# Patient Record
Sex: MALE | Race: BLACK OR AFRICAN AMERICAN | NOT HISPANIC OR LATINO | URBAN - METROPOLITAN AREA
[De-identification: names, ages, dates, MRNs, and addresses within clinical notes are randomized per-mention and may not be internally consistent; named-entity substitution may affect disease eponyms.]

---

## 2017-06-26 ENCOUNTER — HISTORICAL (OUTPATIENT)
Dept: LAB | Facility: HOSPITAL | Age: 52
End: 2017-06-26

## 2017-06-26 LAB
HIV 1+2 AB+HIV1 P24 AG SERPL QL IA: NEGATIVE
RPR SER QL: NORMAL

## 2017-07-27 ENCOUNTER — HISTORICAL (OUTPATIENT)
Dept: RADIOLOGY | Facility: HOSPITAL | Age: 52
End: 2017-07-27

## 2018-07-11 ENCOUNTER — HISTORICAL (OUTPATIENT)
Dept: ADMINISTRATIVE | Facility: HOSPITAL | Age: 53
End: 2018-07-11

## 2019-11-26 ENCOUNTER — HISTORICAL (OUTPATIENT)
Dept: ADMINISTRATIVE | Facility: HOSPITAL | Age: 54
End: 2019-11-26

## 2022-04-09 ENCOUNTER — HISTORICAL (OUTPATIENT)
Dept: ADMINISTRATIVE | Facility: HOSPITAL | Age: 57
End: 2022-04-09

## 2022-04-25 VITALS
WEIGHT: 221.31 LBS | HEIGHT: 71 IN | BODY MASS INDEX: 30.98 KG/M2 | SYSTOLIC BLOOD PRESSURE: 126 MMHG | DIASTOLIC BLOOD PRESSURE: 80 MMHG | OXYGEN SATURATION: 99 %

## 2023-04-17 PROBLEM — E78.00 HYPERCHOLESTEROLEMIA: Status: ACTIVE | Noted: 2023-04-17

## 2023-04-17 PROBLEM — R73.03 PREDIABETES: Status: ACTIVE | Noted: 2023-04-17

## 2023-04-17 PROBLEM — G47.33 OSA ON CPAP: Status: ACTIVE | Noted: 2023-04-17

## 2023-04-17 PROBLEM — E29.1 HYPOGONADISM IN MALE: Status: ACTIVE | Noted: 2023-04-17

## 2024-12-30 ENCOUNTER — OFFICE VISIT (OUTPATIENT)
Dept: URGENT CARE | Facility: CLINIC | Age: 59
End: 2024-12-30
Payer: COMMERCIAL

## 2024-12-30 VITALS
OXYGEN SATURATION: 98 % | DIASTOLIC BLOOD PRESSURE: 72 MMHG | WEIGHT: 219 LBS | TEMPERATURE: 98 F | BODY MASS INDEX: 31.35 KG/M2 | HEIGHT: 70 IN | SYSTOLIC BLOOD PRESSURE: 112 MMHG | HEART RATE: 75 BPM | RESPIRATION RATE: 19 BRPM

## 2024-12-30 DIAGNOSIS — R05.1 ACUTE COUGH: ICD-10-CM

## 2024-12-30 DIAGNOSIS — J06.9 VIRAL UPPER RESPIRATORY TRACT INFECTION: Primary | ICD-10-CM

## 2024-12-30 RX ORDER — PREDNISONE 20 MG/1
20 TABLET ORAL 2 TIMES DAILY
Qty: 14 TABLET | Refills: 0 | Status: SHIPPED | OUTPATIENT
Start: 2024-12-30 | End: 2025-01-06

## 2024-12-30 RX ORDER — PROMETHAZINE HYDROCHLORIDE AND DEXTROMETHORPHAN HYDROBROMIDE 6.25; 15 MG/5ML; MG/5ML
5 SYRUP ORAL EVERY 4 HOURS PRN
Qty: 180 ML | Refills: 0 | Status: SHIPPED | OUTPATIENT
Start: 2024-12-30 | End: 2025-01-09

## 2024-12-30 NOTE — PATIENT INSTRUCTIONS
Mucinex OTC as directed or prescription promethazine DM preferably at night for cough  Take prescription prednisone as directed  Increase oral fluids  Ibuprofen or Tylenol as directed for pain or fever  Please follow up with your primary care provider within 2-5 days if your signs and symptoms have not resolved or worsen.

## 2024-12-30 NOTE — PROGRESS NOTES
"Subjective:      Patient ID: Natan Momin is a 59 y.o. male.    Vitals:  height is 5' 10" (1.778 m) and weight is 99.3 kg (219 lb). His temperature is 98.3 °F (36.8 °C). His blood pressure is 112/72 and his pulse is 75. His respiration is 19 and oxygen saturation is 98%.     Chief Complaint: Nasal Congestion     Patient is a 59 y.o. male who presents to urgent care with complaints of sinus congestion, cough,  runny nose, sneezing x3 days.       HENT:  Positive for congestion.    Respiratory:  Positive for cough.       Objective:     Physical Exam   Constitutional: He is oriented to person, place, and time. He appears well-developed. He is cooperative.  Non-toxic appearance. He does not appear ill. No distress.   HENT:   Head: Normocephalic and atraumatic.   Ears:   Right Ear: Hearing, tympanic membrane, external ear and ear canal normal.   Left Ear: Hearing, tympanic membrane, external ear and ear canal normal.   Nose: Nose normal. No mucosal edema, rhinorrhea or nasal deformity. No epistaxis. Right sinus exhibits no maxillary sinus tenderness and no frontal sinus tenderness. Left sinus exhibits no maxillary sinus tenderness and no frontal sinus tenderness.   Mouth/Throat: Uvula is midline, oropharynx is clear and moist and mucous membranes are normal. No trismus in the jaw. Normal dentition. No uvula swelling. No oropharyngeal exudate, posterior oropharyngeal edema or posterior oropharyngeal erythema.   Eyes: Conjunctivae and lids are normal. No scleral icterus.   Neck: Trachea normal and phonation normal. Neck supple. No edema present. No erythema present. No neck rigidity present.   Cardiovascular: Normal rate, regular rhythm, normal heart sounds and normal pulses.   Pulmonary/Chest: Effort normal and breath sounds normal. No respiratory distress. He has no decreased breath sounds. He has no rhonchi.   Abdominal: Normal appearance.   Musculoskeletal: Normal range of motion.         General: No deformity. Normal " range of motion.   Neurological: He is alert and oriented to person, place, and time. He exhibits normal muscle tone. Coordination normal.   Skin: Skin is warm, dry, intact, not diaphoretic and not pale.   Psychiatric: His speech is normal and behavior is normal. Judgment and thought content normal.   Nursing note and vitals reviewed.    Previous History:     Review of patient's allergies indicates:   Allergen Reactions    Pravastatin      Other reaction(s): leg pain    Simvastatin      Other reaction(s): dyspepsia       Past Medical History:   Diagnosis Date    Hyperlipidemia      Current Outpatient Medications   Medication Instructions    atorvastatin (LIPITOR) 20 mg, Oral, Daily    predniSONE (DELTASONE) 20 mg, Oral, 2 times daily    promethazine-dextromethorphan (PROMETHAZINE-DM) 6.25-15 mg/5 mL Syrp 5 mLs, Oral, Every 4 hours PRN    sildenafil (REVATIO) 20 mg Tab TAKE 3 TABLETS BY MOUTH 1-2 HOURS PRIOR TO INTERCOURSE     History reviewed. No pertinent surgical history.  Family History   Problem Relation Name Age of Onset    Heart disease Mother      Hyperlipidemia Father         Social History     Tobacco Use    Smoking status: Never    Smokeless tobacco: Never   Substance Use Topics    Alcohol use: Yes    Drug use: Never     Assessment:     1. Viral upper respiratory tract infection    2. Acute cough        Plan:   Mucinex OTC as directed or prescription promethazine DM preferably at night for cough  Take prescription prednisone as directed  Increase oral fluids  Ibuprofen or Tylenol as directed for pain or fever  Please follow up with your primary care provider within 2-5 days if your signs and symptoms have not resolved or worsen.      Viral upper respiratory tract infection    Acute cough  -     predniSONE (DELTASONE) 20 MG tablet; Take 1 tablet (20 mg total) by mouth 2 (two) times daily. for 7 days  Dispense: 14 tablet; Refill: 0  -     promethazine-dextromethorphan (PROMETHAZINE-DM) 6.25-15 mg/5 mL Syrp;  Take 5 mLs by mouth every 4 (four) hours as needed (cough).  Dispense: 180 mL; Refill: 0

## 2025-01-15 ENCOUNTER — OFFICE VISIT (OUTPATIENT)
Dept: FAMILY MEDICINE | Facility: CLINIC | Age: 60
End: 2025-01-15
Payer: COMMERCIAL

## 2025-01-15 ENCOUNTER — LAB VISIT (OUTPATIENT)
Dept: LAB | Facility: HOSPITAL | Age: 60
End: 2025-01-15
Attending: FAMILY MEDICINE
Payer: COMMERCIAL

## 2025-01-15 VITALS
HEIGHT: 70 IN | OXYGEN SATURATION: 99 % | RESPIRATION RATE: 17 BRPM | BODY MASS INDEX: 31.4 KG/M2 | WEIGHT: 219.31 LBS | DIASTOLIC BLOOD PRESSURE: 81 MMHG | TEMPERATURE: 99 F | SYSTOLIC BLOOD PRESSURE: 125 MMHG | HEART RATE: 66 BPM

## 2025-01-15 DIAGNOSIS — Z13.6 ENCOUNTER FOR SCREENING FOR CARDIOVASCULAR DISORDERS: ICD-10-CM

## 2025-01-15 DIAGNOSIS — E29.1 HYPOGONADISM IN MALE: ICD-10-CM

## 2025-01-15 DIAGNOSIS — D70.9 NEUTROPENIA, UNSPECIFIED TYPE: ICD-10-CM

## 2025-01-15 DIAGNOSIS — E78.49 ESSENTIAL FAMILIAL HYPERLIPIDEMIA: ICD-10-CM

## 2025-01-15 DIAGNOSIS — G47.33 OSA ON CPAP: ICD-10-CM

## 2025-01-15 DIAGNOSIS — E78.49 ESSENTIAL FAMILIAL HYPERLIPIDEMIA: Primary | ICD-10-CM

## 2025-01-15 DIAGNOSIS — Z11.59 ENCOUNTER FOR HEPATITIS C SCREENING TEST FOR LOW RISK PATIENT: ICD-10-CM

## 2025-01-15 DIAGNOSIS — R73.03 PREDIABETES: ICD-10-CM

## 2025-01-15 DIAGNOSIS — E66.811 CLASS 1 OBESITY DUE TO EXCESS CALORIES WITH SERIOUS COMORBIDITY AND BODY MASS INDEX (BMI) OF 31.0 TO 31.9 IN ADULT: ICD-10-CM

## 2025-01-15 DIAGNOSIS — Z76.89 ENCOUNTER TO ESTABLISH CARE WITH NEW DOCTOR: Primary | ICD-10-CM

## 2025-01-15 DIAGNOSIS — E66.09 CLASS 1 OBESITY DUE TO EXCESS CALORIES WITH SERIOUS COMORBIDITY AND BODY MASS INDEX (BMI) OF 31.0 TO 31.9 IN ADULT: ICD-10-CM

## 2025-01-15 LAB
ALBUMIN SERPL-MCNC: 4.3 G/DL (ref 3.5–5)
ALBUMIN/GLOB SERPL: 1.4 RATIO (ref 1.1–2)
ALP SERPL-CCNC: 75 UNIT/L (ref 40–150)
ALT SERPL-CCNC: 17 UNIT/L (ref 0–55)
ANION GAP SERPL CALC-SCNC: 7 MEQ/L
AST SERPL-CCNC: 19 UNIT/L (ref 5–34)
BASOPHILS # BLD AUTO: 0.02 X10(3)/MCL
BASOPHILS NFR BLD AUTO: 0.5 %
BILIRUB SERPL-MCNC: 0.6 MG/DL
BUN SERPL-MCNC: 12.5 MG/DL (ref 8.4–25.7)
CALCIUM SERPL-MCNC: 9.7 MG/DL (ref 8.4–10.2)
CHLORIDE SERPL-SCNC: 107 MMOL/L (ref 98–107)
CHOLEST SERPL-MCNC: 199 MG/DL
CHOLEST/HDLC SERPL: 4 {RATIO} (ref 0–5)
CK SERPL-CCNC: 104 U/L (ref 30–200)
CO2 SERPL-SCNC: 27 MMOL/L (ref 22–29)
CREAT SERPL-MCNC: 1.21 MG/DL (ref 0.72–1.25)
CREAT/UREA NIT SERPL: 10
EOSINOPHIL # BLD AUTO: 0.08 X10(3)/MCL (ref 0–0.9)
EOSINOPHIL NFR BLD AUTO: 2.1 %
ERYTHROCYTE [DISTWIDTH] IN BLOOD BY AUTOMATED COUNT: 14.3 % (ref 11.5–17)
EST. AVERAGE GLUCOSE BLD GHB EST-MCNC: 122.6 MG/DL
GFR SERPLBLD CREATININE-BSD FMLA CKD-EPI: >60 ML/MIN/1.73/M2
GLOBULIN SER-MCNC: 3.1 GM/DL (ref 2.4–3.5)
GLUCOSE SERPL-MCNC: 108 MG/DL (ref 74–100)
HBA1C MFR BLD: 5.9 %
HCT VFR BLD AUTO: 45.7 % (ref 42–52)
HCV AB SERPL QL IA: NONREACTIVE
HDLC SERPL-MCNC: 54 MG/DL (ref 35–60)
HGB BLD-MCNC: 14.7 G/DL (ref 14–18)
IMM GRANULOCYTES # BLD AUTO: 0.01 X10(3)/MCL (ref 0–0.04)
IMM GRANULOCYTES NFR BLD AUTO: 0.3 %
LDLC SERPL CALC-MCNC: 123 MG/DL (ref 50–140)
LYMPHOCYTES # BLD AUTO: 1.75 X10(3)/MCL (ref 0.6–4.6)
LYMPHOCYTES NFR BLD AUTO: 46.4 %
MCH RBC QN AUTO: 27.7 PG (ref 27–31)
MCHC RBC AUTO-ENTMCNC: 32.2 G/DL (ref 33–36)
MCV RBC AUTO: 86.1 FL (ref 80–94)
MONOCYTES # BLD AUTO: 0.36 X10(3)/MCL (ref 0.1–1.3)
MONOCYTES NFR BLD AUTO: 9.5 %
NEUTROPHILS # BLD AUTO: 1.55 X10(3)/MCL (ref 2.1–9.2)
NEUTROPHILS NFR BLD AUTO: 41.2 %
NRBC BLD AUTO-RTO: 0 %
PLATELET # BLD AUTO: 201 X10(3)/MCL (ref 130–400)
PMV BLD AUTO: 10.1 FL (ref 7.4–10.4)
POTASSIUM SERPL-SCNC: 3.9 MMOL/L (ref 3.5–5.1)
PROT SERPL-MCNC: 7.4 GM/DL (ref 6.4–8.3)
RBC # BLD AUTO: 5.31 X10(6)/MCL (ref 4.7–6.1)
SODIUM SERPL-SCNC: 141 MMOL/L (ref 136–145)
TRIGL SERPL-MCNC: 111 MG/DL (ref 34–140)
VLDLC SERPL CALC-MCNC: 22 MG/DL
WBC # BLD AUTO: 3.77 X10(3)/MCL (ref 4.5–11.5)

## 2025-01-15 PROCEDURE — 80061 LIPID PANEL: CPT

## 2025-01-15 PROCEDURE — 80053 COMPREHEN METABOLIC PANEL: CPT

## 2025-01-15 PROCEDURE — 83550 IRON BINDING TEST: CPT

## 2025-01-15 PROCEDURE — 82550 ASSAY OF CK (CPK): CPT

## 2025-01-15 PROCEDURE — 86803 HEPATITIS C AB TEST: CPT

## 2025-01-15 PROCEDURE — 36415 COLL VENOUS BLD VENIPUNCTURE: CPT

## 2025-01-15 PROCEDURE — 1160F RVW MEDS BY RX/DR IN RCRD: CPT | Mod: CPTII,,, | Performed by: FAMILY MEDICINE

## 2025-01-15 PROCEDURE — 3074F SYST BP LT 130 MM HG: CPT | Mod: CPTII,,, | Performed by: FAMILY MEDICINE

## 2025-01-15 PROCEDURE — 85025 COMPLETE CBC W/AUTO DIFF WBC: CPT

## 2025-01-15 PROCEDURE — 3079F DIAST BP 80-89 MM HG: CPT | Mod: CPTII,,, | Performed by: FAMILY MEDICINE

## 2025-01-15 PROCEDURE — 3008F BODY MASS INDEX DOCD: CPT | Mod: CPTII,,, | Performed by: FAMILY MEDICINE

## 2025-01-15 PROCEDURE — 1159F MED LIST DOCD IN RCRD: CPT | Mod: CPTII,,, | Performed by: FAMILY MEDICINE

## 2025-01-15 PROCEDURE — 82607 VITAMIN B-12: CPT

## 2025-01-15 PROCEDURE — 83036 HEMOGLOBIN GLYCOSYLATED A1C: CPT

## 2025-01-15 PROCEDURE — 99204 OFFICE O/P NEW MOD 45 MIN: CPT | Mod: ,,, | Performed by: FAMILY MEDICINE

## 2025-01-15 NOTE — PATIENT INSTRUCTIONS
Kalpesh Gama,     If you are due for any health screening(s) below please notify me so we can arrange them to be ordered and scheduled. Most healthy patients at your age complete them, but you are free to accept or refuse.     If you can't do it, I'll definitely understand. If you can, I'd certainly appreciate it!    All of your core healthy metrics are met.

## 2025-01-15 NOTE — PROGRESS NOTES
Patient ID: [id number]     Chief Complaint: Establish Care        HPI:     Natan Momin is a 59 y.o. male here today to establish care with new PCP, patient is UTD on wellness with previous PCP (Dr. Mitchell) on 04/18/2024.   - Patient has HLD controlled with Rx, no side effects, asymptomatic, due for 6 month labs, patient is fasting today. He would like calcium score done.   - Patient has pre-diabetes, stable with lifestyle, asymptomatic, due for repeat labs.   - He is obese, working on losing weight on his own, he is not interested weight loss medication or seeing a dietician at this time.   - He would like Hepatitis C testing.   - He is not interested in Shingrix, Prevnar-20, and Flu vaccine.   Sleep MD: Dr. Turner, dx with COMPA 3 years ago, improved with CPAP, compliant with nightly CPAP use  UROL: Dr. De La Cruz, Clomid for hypogonadism. Sees him twice a year. He also treats his ED, stable with Rx, no side effects.  - Patient is without any other complaints today.           -------------------------------------    Hyperlipidemia        History reviewed. No pertinent surgical history.    Review of patient's allergies indicates:   Allergen Reactions    Pravastatin      Other reaction(s): leg pain    Simvastatin      Other reaction(s): dyspepsia       Outpatient Medications Marked as Taking for the 1/15/25 encounter (Office Visit) with Vickie Talamantes MD   Medication Sig Dispense Refill    atorvastatin (LIPITOR) 20 MG tablet Take 1 tablet (20 mg total) by mouth once daily. 90 tablet 3    sildenafil (REVATIO) 20 mg Tab TAKE 3 TABLETS BY MOUTH 1-2 HOURS PRIOR TO INTERCOURSE         Social History     Socioeconomic History    Marital status:     Number of children: 2   Occupational History    Occupation: Liason at [employer]   Tobacco Use    Smoking status: Never    Smokeless tobacco: Never   Substance and Sexual Activity    Alcohol use: Not Currently     Alcohol/week: 2.0 standard drinks of alcohol      Types: 1 Glasses of wine, 1 Cans of beer per week    Drug use: Never    Sexual activity: Yes     Partners: Female     Social Drivers of Health     Financial Resource Strain: Low Risk  (1/8/2025)    Overall Financial Resource Strain (CARDIA)     Difficulty of Paying Living Expenses: Not very hard   Food Insecurity: No Food Insecurity (1/8/2025)    Hunger Vital Sign     Worried About Running Out of Food in the Last Year: Never true     Ran Out of Food in the Last Year: Never true   Physical Activity: Insufficiently Active (1/8/2025)    Exercise Vital Sign     Days of Exercise per Week: 2 days     Minutes of Exercise per Session: 20 min   Stress: No Stress Concern Present (1/8/2025)    [employer] - Occupational Stress Questionnaire     Feeling of Stress : Only a little   Housing Stability: Unknown (1/8/2025)    Housing Stability Vital Sign     Unable to Pay for Housing in the Last Year: No        Family History   Problem Relation Name Age of Onset    Heart disease Mother      Hyperlipidemia Father          Subjective:       Review of Systems:    See HPI for details    Constitutional: Denies Change in appetite. Denies Chills. Denies Fever. Denies Night sweats.  Eye: Denies Blurred vision. Denies Discharge. Denies Eye pain.  ENT: Denies Decreased hearing. Denies Sore throat. Denies Swollen glands.  Respiratory: Denies Cough. Denies Shortness of breath. Denies Shortness of breath with exertion. Denies Wheezing.  Cardiovascular: Denies Chest pain at rest. Denies Chest pain with exertion. Denies Irregular heartbeat. Denies Palpitations.  Gastrointestinal: Denies Abdominal pain. Denies Diarrhea. Denies Nausea. Denies Vomiting. Denies Hematemesis or Hematochezia.  Genitourinary: Denies Dysuria. Denies Urinary frequency. Denies Urinary urgency. Denies Blood in urine.  Endocrine: Denies Cold intolerance. Denies Excessive thirst. Denies Heat intolerance. Denies Weight loss. Denies Weight  "gain.  Musculoskeletal: Denies Painful joints. Denies Weakness.  Integumentary: Denies Rash. Denies Itching. Denies Dry skin.  Neurologic: Denies Dizziness. Denies Fainting. Denies Headache.  Psychiatric: Denies Depression. Denies Anxiety. Denies Suicidal/Homicidal ideations.    All Other ROS: Negative except as stated in HPI.       Objective:     /81 (BP Location: Left arm, Patient Position: Sitting)   Pulse 66   Temp 98.6 °F (37 °C) (Oral)   Resp 17   Ht 5' 10" (1.778 m)   Wt 99.5 kg (219 lb 4.8 oz)   SpO2 99%   BMI 31.47 kg/m²     Physical Exam    General: Alert and oriented, No acute distress. Obese.   Head: Normocephalic, Atraumatic.  Eye: Pupils are equal, round and reactive to light, Extraocular movements are intact, Sclera non-icteric.  Ears/Nose/Throat: Normal, Mucosa moist,Clear.  Neck/Thyroid: Supple, Non-tender, No carotid bruit, No palpable thyromegaly or thyroid nodule, No lymphadenopathy, No JVD, Full range of motion.  Respiratory: Clear to auscultation bilaterally; No wheezes, rales or rhonchi,Non-labored respirations, Symmetrical chest wall expansion.  Cardiovascular: Regular rate and rhythm, S1/S2 normal, No murmurs, rubs or gallops.  Gastrointestinal: Soft, Non-tender, Non-distended, Normal bowel sounds, No palpable organomegaly.  Musculoskeletal: Normal range of motion.  Integumentary: Warm, Dry, Intact, No suspicious lesions or rashes.  Extremities: No clubbing, cyanosis or edema  Neurologic: No focal deficits, Cranial Nerves II-XII are grossly intact, Motor strength normal upper and lower extremities, Sensory exam intact.  Psychiatric: Normal interaction, Coherent speech, Euthymic mood, Appropriate affect.         Assessment:       ICD-10-CM ICD-9-CM   1. Essential familial hyperlipidemia  E78.49 272.2   2. Prediabetes  R73.03 790.29   3. COMPA on CPAP  G47.33 327.23   4. Class 1 obesity due to excess calories with serious comorbidity and body mass index (BMI) of 31.0 to 31.9 in " adult  E66.811 278.00    E66.09 V85.31    Z68.31    5. Hypogonadism in male  E29.1 257.2   6. Encounter for hepatitis C screening test for low risk patient  Z11.59 V73.89   7. Encounter for screening for cardiovascular disorders  Z13.6 V81.2        Plan:     Problem List Items Addressed This Visit          Cardiac/Vascular    Essential familial hyperlipidemia - Primary    Relevant Orders    CK    Comprehensive Metabolic Panel    Lipid Panel       Endocrine    Prediabetes    Relevant Orders    Comprehensive Metabolic Panel    Hemoglobin A1C    Hypogonadism in male    Relevant Orders    CBC Auto Differential    Class 1 obesity due to excess calories with serious comorbidity and body mass index (BMI) of 31.0 to 31.9 in adult       Other    COMPA on CPAP     Other Visit Diagnoses       Encounter for hepatitis C screening test for low risk patient        Relevant Orders    Hepatitis C Antibody    Encounter for screening for cardiovascular disorders        Relevant Orders    CT Calcium Scoring Cardiac         1. Essential familial hyperlipidemia  - CK; Future  - Comprehensive Metabolic Panel; Future  - Lipid Panel; Future  - Continue Lipitor as prescribed for now, will treat pending results.   Continue  Stressed importance of dietary modifications. Follow a low cholesterol, low saturated fat diet with less that 200mg of cholesterol a day.  Avoid fried foods and high saturated fats (high saturated fats less than 7% of calories).  Add Flax Seed/Fish Oil supplements to diet. Increase dietary fiber.  Regular exercise can reduce LDL and raise HDL. Stressed importance of physical activity 5 times per week for 30 minutes per day.      2. Prediabetes  - Comprehensive Metabolic Panel; Future  - Hemoglobin A1C; Future  - Will treat pending results.   Continue  Stressed importance of dietary modifications. Follow a low cholesterol, low saturated fat diet with less that 200mg of cholesterol a day.  Avoid fried foods and high saturated  fats (high saturated fats less than 7% of calories).  Add Flax Seed/Fish Oil supplements to diet. Increase dietary fiber.  Regular exercise can reduce LDL and raise HDL. Stressed importance of physical activity 5 times per week for 30 minutes per day.      3. COMPA on CPAP  - Continue nightly CPAP use.     4. Class 1 obesity due to excess calories with serious comorbidity and body mass index (BMI) of 31.0 to 31.9 in adult  Body mass index is 31.47 kg/m².  Goal BMI <30.  Exercise 5 times a week for 30 minutes per day.  Avoid soda, simple sugars, excessive rice, potatoes or bread. Limit fast foods and fried foods.  Choose complex carbs in moderation (example: green vegetables, beans, oatmeal). Eat plenty of fresh fruits and vegetables with lean meats daily.  Do not skip meals. Eat a balanced portion size.  Avoid fad diets. Consider permanent healthy life style changes.      5. Hypogonadism in male  - CBC Auto Differential; Future  - Continue followup with Urology as scheduled, asymptomatic.     6. Encounter for hepatitis C screening test for low risk patient  - Hepatitis C Antibody; Future    7. Encounter for screening for cardiovascular disorders  - CT Calcium Scoring Cardiac; Future        Natan was seen today for establish care.    Diagnoses and all orders for this visit:    Essential familial hyperlipidemia  -     CK; Future  -     Comprehensive Metabolic Panel; Future  -     Lipid Panel; Future    Prediabetes  -     Comprehensive Metabolic Panel; Future  -     Hemoglobin A1C; Future    COMPA on CPAP    Class 1 obesity due to excess calories with serious comorbidity and body mass index (BMI) of 31.0 to 31.9 in adult    Hypogonadism in male  -     CBC Auto Differential; Future    Encounter for hepatitis C screening test for low risk patient  -     Hepatitis C Antibody; Future    Encounter for screening for cardiovascular disorders  -     CT Calcium Scoring Cardiac; Future          Medication List with Changes/Refills    Current Medications    ATORVASTATIN (LIPITOR) 20 MG TABLET    Take 1 tablet (20 mg total) by mouth once daily.       Start Date: 4/18/2024 End Date: --    SILDENAFIL (REVATIO) 20 MG TAB    TAKE 3 TABLETS BY MOUTH 1-2 HOURS PRIOR TO INTERCOURSE       Start Date: 10/26/2022End Date: --          Follow up in about 3 months (around 4/21/2025) for Wellness.

## 2025-01-17 ENCOUNTER — TELEPHONE (OUTPATIENT)
Dept: FAMILY MEDICINE | Facility: CLINIC | Age: 60
End: 2025-01-17
Payer: COMMERCIAL

## 2025-01-17 DIAGNOSIS — D70.9 NEUTROPENIA, UNSPECIFIED TYPE: Primary | ICD-10-CM

## 2025-01-17 LAB
IRON SATN MFR SERPL: 42 % (ref 20–50)
IRON SERPL-MCNC: 103 UG/DL (ref 65–175)
TIBC SERPL-MCNC: 141 UG/DL (ref 60–240)
TIBC SERPL-MCNC: 244 UG/DL (ref 250–450)
TRANSFERRIN SERPL-MCNC: 217 MG/DL (ref 174–364)
VIT B12 SERPL-MCNC: 1071 PG/ML (ref 213–816)

## 2025-01-17 NOTE — TELEPHONE ENCOUNTER
----- Message from Vickie Talamantes MD sent at 1/17/2025  1:48 AM CST -----  - WBC count is mildly low, 3.77, NL: 4.5-11.5, could be due to genetics, recent infection, auto-immune disease, medications, vitamin deficiency, or incidental, order to add Vitamin B12, folate, iron profile, Auto-immune disease panel, and hemoglobin electrophoresis to lab is in file, my office staff will contact the lab to add or lab will need to be redrawn.    - Testing is negative for Hepatitis C.     - Patient is at increased risk of developing diabetes (Pre-diabetic), HgA1C: 5.9%, normal <5.7%, diabetes starts at 6.5% or higher, needs American Diabetic Association Diet, exercise, and 10% weight loss to prevent progression to diabetes, recheck CMP, HgA1C in 07/2025.     - Cholesterol is well controlled, continue current cholesterol treatment plan, recheck CMP, FLP, CPK in 07/2025.     - Remaining labs are essentially normal.     Thank you for choosing [facility] for your medical needs. Have a great day!    -Vickie Taalmantes MD, FAAFP

## 2025-01-18 DIAGNOSIS — R79.89 ELEVATED VITAMIN B12 LEVEL: Primary | ICD-10-CM

## 2025-01-24 ENCOUNTER — PATIENT MESSAGE (OUTPATIENT)
Dept: FAMILY MEDICINE | Facility: CLINIC | Age: 60
End: 2025-01-24
Payer: COMMERCIAL

## 2025-02-17 ENCOUNTER — RESULTS FOLLOW-UP (OUTPATIENT)
Dept: FAMILY MEDICINE | Facility: CLINIC | Age: 60
End: 2025-02-17

## 2025-02-17 ENCOUNTER — PATIENT MESSAGE (OUTPATIENT)
Dept: FAMILY MEDICINE | Facility: CLINIC | Age: 60
End: 2025-02-17
Payer: COMMERCIAL

## 2025-04-10 ENCOUNTER — TELEPHONE (OUTPATIENT)
Dept: FAMILY MEDICINE | Facility: CLINIC | Age: 60
End: 2025-04-10
Payer: COMMERCIAL

## 2025-04-10 DIAGNOSIS — Z00.00 WELLNESS EXAMINATION: Primary | ICD-10-CM

## 2025-04-15 ENCOUNTER — OFFICE VISIT (OUTPATIENT)
Dept: FAMILY MEDICINE | Facility: CLINIC | Age: 60
End: 2025-04-15
Payer: COMMERCIAL

## 2025-04-15 VITALS
DIASTOLIC BLOOD PRESSURE: 64 MMHG | SYSTOLIC BLOOD PRESSURE: 100 MMHG | WEIGHT: 214.13 LBS | BODY MASS INDEX: 30.66 KG/M2 | HEIGHT: 70 IN | OXYGEN SATURATION: 99 % | HEART RATE: 89 BPM

## 2025-04-15 DIAGNOSIS — J06.9 UPPER RESPIRATORY TRACT INFECTION, UNSPECIFIED TYPE: ICD-10-CM

## 2025-04-15 DIAGNOSIS — Z00.00 WELLNESS EXAMINATION: Primary | ICD-10-CM

## 2025-04-15 DIAGNOSIS — Z12.11 SCREENING FOR COLON CANCER: ICD-10-CM

## 2025-04-15 DIAGNOSIS — D70.9 NEUTROPENIA, UNSPECIFIED TYPE: ICD-10-CM

## 2025-04-15 DIAGNOSIS — R79.89 ELEVATED VITAMIN B12 LEVEL: ICD-10-CM

## 2025-04-15 DIAGNOSIS — Z12.5 SCREENING PSA (PROSTATE SPECIFIC ANTIGEN): ICD-10-CM

## 2025-04-15 RX ORDER — ALBUTEROL SULFATE 90 UG/1
2 INHALANT RESPIRATORY (INHALATION) EVERY 6 HOURS PRN
Qty: 18 G | Refills: 0 | Status: SHIPPED | OUTPATIENT
Start: 2025-04-15 | End: 2026-04-15

## 2025-04-15 RX ORDER — PROMETHAZINE HYDROCHLORIDE AND DEXTROMETHORPHAN HYDROBROMIDE 6.25; 15 MG/5ML; MG/5ML
5 SYRUP ORAL EVERY 8 HOURS PRN
Qty: 120 ML | Refills: 0 | Status: SHIPPED | OUTPATIENT
Start: 2025-04-15

## 2025-04-15 RX ORDER — AZITHROMYCIN 250 MG/1
TABLET, FILM COATED ORAL
Qty: 6 TABLET | Refills: 0 | Status: SHIPPED | OUTPATIENT
Start: 2025-04-15 | End: 2025-04-20

## 2025-04-15 NOTE — PROGRESS NOTES
Patient ID: [id number]     Chief Complaint: Annual Exam        HPI:     Natan Momin is a 59 y.o. male here today for annual wellness exam.  The patient presents for well adult exam.    The patient's general health status is described as good.    The patient's diet is described as balanced.    Exercise: occasional.    Associated symptoms consist of denies weight loss, denies weight gain, denies fatigue, denies headache, denies hearing loss and denies vision changes.    Additional pertinent history: last dental exam: every 6 months, last eye exam: > 1 year ago (wears reading glasses, he needs referral)  He is due for annual labs, orders are in file.     Colon Cancer Screening - Colonoscopy on 11/17/2020, due for 5 year Colonoscopy with GI (Dr. Adams).     Vaccinations: UTD    - Patient has HLD controlled with Rx, no side effects, asymptomatic, due for 6 month labs, patient is fasting today. He had negative calcium score in 2025.   - Patient has pre-diabetes, stable with lifestyle, asymptomatic.   - He is obese, working on losing weight on his own, lost 4 pounds since last visit, he is not interested weight loss medication or seeing a dietician at this time.   Sleep MD: Dr. Turner, dx with COMPA 3 years ago, improved with CPAP, compliant with nightly CPAP use  UROL: Dr. De La Cruz, [place] for hypogonadism. Sees him twice a year. He also treats his ED, stable with Rx, no side effects. He is due for testosterone levels.  - Patient complains of seasonal allergies, fatigue, chest congestion and dry cough x 1-2 weeks. He reports subjective fever, sneezing, and watery eyes. He has tried Xyzal with mild resolution of symptoms. He works in a school. He denies chills, SOB, and wheezing. He would like allergy testing done.   - Patient is without any other complaints today.     Advance Care Planning     Date: 04/15/2025  Patient did not wish or was not able to name a surrogate decision maker or provide an Advance Care Plan.                -------------------------------------    Hyperlipidemia        History reviewed. No pertinent surgical history.    Review of patient's allergies indicates:   Allergen Reactions    Pravastatin      Other reaction(s): leg pain    Simvastatin      Other reaction(s): dyspepsia       Outpatient Medications Marked as Taking for the 4/15/25 encounter (Office Visit) with Vickie Talamantes MD   Medication Sig Dispense Refill    atorvastatin (LIPITOR) 20 MG tablet Take 1 tablet (20 mg total) by mouth once daily. 90 tablet 3    sildenafil (REVATIO) 20 mg Tab TAKE 3 TABLETS BY MOUTH 1-2 HOURS PRIOR TO INTERCOURSE         Social History[1]     Family History   Problem Relation Name Age of Onset    Heart disease Mother      Hyperlipidemia Father          Subjective:       Review of Systems:    See HPI for details    Constitutional: As per HPI. Denies Change in appetite. Denies Chills. Denies Fever. Denies Night sweats.  Eye: Denies Blurred vision. Denies Discharge. Denies Eye pain.  ENT: Denies Decreased hearing. Denies Sore throat. Denies Swollen glands.  Respiratory: Reports Cough. Denies Shortness of breath. Denies Shortness of breath with exertion. Denies Wheezing.  Cardiovascular: Denies Chest pain at rest. Denies Chest pain with exertion. Denies Irregular heartbeat. Denies Palpitations.  Gastrointestinal: Denies Abdominal pain. Denies Diarrhea. Denies Nausea. Denies Vomiting. Denies Hematemesis or Hematochezia.  Genitourinary: Denies Dysuria. Denies Urinary frequency. Denies Urinary urgency. Denies Blood in urine.  Endocrine: Denies Cold intolerance. Denies Excessive thirst. Denies Heat intolerance. Denies Weight loss. Denies Weight gain.  Musculoskeletal: Denies Painful joints. Denies Weakness.  Integumentary: Denies Rash. Denies Itching. Denies Dry skin.  Neurologic: Denies Dizziness. Denies Fainting. Denies Headache.  Psychiatric: Denies Depression. Denies Anxiety. Denies Suicidal/Homicidal ideations.    All  "Other ROS: Negative except as stated in HPI.       Objective:     /64 (BP Location: Left arm, Patient Position: Sitting)   Pulse 89   Ht 5' 10" (1.778 m)   Wt 97.1 kg (214 lb 1.6 oz)   SpO2 99%   BMI 30.72 kg/m²     Physical Exam    General: Alert and oriented, No acute distress.   Appearance: Obese.   Eye: Pupils are equal, round and reactive to light, Normal conjunctiva.   HENT: Normocephalic, Tympanic membranes are clear, Normal hearing, Oral mucosa is moist, No pharyngeal erythema.   Throat: Pharynx ( Not edematous, No exudate ). OP with lymphoid hyperplasia secondary to postnasal drip.  Neck: Supple, Non-tender, No carotid bruit, No lymphadenopathy, No thyromegaly.   Respiratory: Lungs are clear to auscultation, Respirations are non-labored, Breath sounds are equal, Symmetrical chest wall expansion, No chest wall tenderness. Dry cough.   Cardiovascular: Normal rate, Regular rhythm, No murmur, Good pulses equal in all extremities, No edema.   Gastrointestinal: Soft, Non-tender, Non-distended, Normal bowel sounds, No organomegaly, Abdomen.   Genitourinary: No costovertebral angle tenderness.   Musculoskeletal: Normal range of motion, No tenderness, Normal gait.   Neurologic: No focal deficits, Cranial Nerves II-XII are grossly intact.   Psychiatric: Cooperative, Appropriate mood & affect, Normal judgment, Non-suicidal.   Mood and affect: Calm.   Behavior: Relaxed         Assessment:       ICD-10-CM ICD-9-CM   1. Wellness examination  Z00.00 V70.0   2. Elevated vitamin B12 level  R79.89 790.99   3. Neutropenia, unspecified type  D70.9 288.00   4. Screening PSA (prostate specific antigen)  Z12.5 V76.44   5. Screening for colon cancer  Z12.11 V76.51   6. Upper respiratory tract infection, unspecified type  J06.9 465.9        Plan:     Problem List Items Addressed This Visit          Oncology    Neutropenia    Relevant Orders    CBC Auto Differential     Other Visit Diagnoses         Wellness examination   "  -  Primary    Relevant Orders    Ambulatory referral/consult to Ophthalmology    Vitamin B12    CBC Auto Differential    Comprehensive Metabolic Panel    TSH    Urinalysis, Reflex to Urine Culture    Testosterone    PSA, Screening      Elevated vitamin B12 level        Relevant Orders    Vitamin B12      Screening PSA (prostate specific antigen)        Relevant Orders    PSA, Screening      Screening for colon cancer        Relevant Orders    Ambulatory referral/consult to Gastroenterology      Upper respiratory tract infection, unspecified type        Relevant Medications    promethazine-dextromethorphan (PROMETHAZINE-DM) 6.25-15 mg/5 mL Syrp    albuterol (VENTOLIN HFA) 90 mcg/actuation inhaler    azithromycin (Z-MEGAN) 250 MG tablet    Other Relevant Orders    Allergen Respiratory Panel IgE - Region 6         1. Wellness examination  - Ambulatory referral/consult to Ophthalmology; Future  - Vitamin B12; Future  - CBC Auto Differential; Future  - Comprehensive Metabolic Panel; Future  - TSH; Future  - Urinalysis, Reflex to Urine Culture; Future  - Testosterone; Future  - PSA, Screening; Future  - Will treat pending lab results. Diet, exercise, and 10% weight loss encouraged. Keep appointment for dental exams x q6 months as scheduled. Keep appointment for annual eye exam as scheduled. Keep appointment with specialists as scheduled. Notify M.D. or ER if temp greater than 100.4, or any acute illness.      2. Elevated vitamin B12 level  - Vitamin B12; Future  - Hold exogenous Vitamin B12 supplementation. Will treat pending results.     3. Neutropenia, unspecified type  - CBC Auto Differential; Future  - Asymptomatic, recheck CBC, will treat pending results.     4. Screening PSA (prostate specific antigen)  - PSA, Screening; Future    5. Screening for colon cancer  - Ambulatory referral/consult to Gastroenterology; Future for screening colonoscopy    6. Upper respiratory tract infection, unspecified type  -  promethazine-dextromethorphan (PROMETHAZINE-DM) 6.25-15 mg/5 mL Syrp; Take 5 mLs by mouth every 8 (eight) hours as needed (cough).  Dispense: 120 mL; Refill: 0  - albuterol (VENTOLIN HFA) 90 mcg/actuation inhaler; Inhale 2 puffs into the lungs every 6 (six) hours as needed for Wheezing. Rescue  Dispense: 18 g; Refill: 0  - azithromycin (Z-MEGAN) 250 MG tablet; Take 2 tablets by mouth on day 1; Take 1 tablet by mouth on days 2-5  Dispense: 6 tablet; Refill: 0  - Allergen Respiratory Panel IgE - Region 6; Future  - Increase fluid intake. I sent an antibiotic (Z-Megan), cough medication (promethazine DM), and an inhaler (Albuterol) to help her symptoms. Will treat pending allergy testing results. If workup is negative and symptoms are refractory to treatment, will proceed with obtaining a Chest Xray. Notify M.D. or ER if symptoms persist or worsen, shortness of breath, chest pain, coughing up blood, temp >100.4, or any acute illness.           Natan was seen today for annual exam.    Diagnoses and all orders for this visit:    Wellness examination  -     Ambulatory referral/consult to Ophthalmology; Future  -     Vitamin B12; Future  -     CBC Auto Differential; Future  -     Comprehensive Metabolic Panel; Future  -     TSH; Future  -     Urinalysis, Reflex to Urine Culture; Future  -     Testosterone; Future  -     PSA, Screening; Future    Elevated vitamin B12 level  -     Vitamin B12; Future    Neutropenia, unspecified type  -     CBC Auto Differential; Future    Screening PSA (prostate specific antigen)  -     PSA, Screening; Future    Screening for colon cancer  -     Ambulatory referral/consult to Gastroenterology; Future    Upper respiratory tract infection, unspecified type  -     promethazine-dextromethorphan (PROMETHAZINE-DM) 6.25-15 mg/5 mL Syrp; Take 5 mLs by mouth every 8 (eight) hours as needed (cough).  -     albuterol (VENTOLIN HFA) 90 mcg/actuation inhaler; Inhale 2 puffs into the lungs every 6 (six)  hours as needed for Wheezing. Rescue  -     azithromycin (Z-MEGAN) 250 MG tablet; Take 2 tablets by mouth on day 1; Take 1 tablet by mouth on days 2-5  -     Allergen Respiratory Panel IgE - Region 6; Future          Medication List with Changes/Refills   New Medications    ALBUTEROL (VENTOLIN HFA) 90 MCG/ACTUATION INHALER    Inhale 2 puffs into the lungs every 6 (six) hours as needed for Wheezing. Rescue       Start Date: 4/15/2025 End Date: 4/15/2026    AZITHROMYCIN (Z-MEGAN) 250 MG TABLET    Take 2 tablets by mouth on day 1; Take 1 tablet by mouth on days 2-5       Start Date: 4/15/2025 End Date: 4/20/2025    PROMETHAZINE-DEXTROMETHORPHAN (PROMETHAZINE-DM) 6.25-15 MG/5 ML SYRP    Take 5 mLs by mouth every 8 (eight) hours as needed (cough).       Start Date: 4/15/2025 End Date: --   Current Medications    ATORVASTATIN (LIPITOR) 20 MG TABLET    Take 1 tablet (20 mg total) by mouth once daily.       Start Date: 4/18/2024 End Date: --    SILDENAFIL (REVATIO) 20 MG TAB    TAKE 3 TABLETS BY MOUTH 1-2 HOURS PRIOR TO INTERCOURSE       Start Date: 10/26/2022End Date: --          Follow up in about 6 months (around 10/15/2025) for Prediabetes Followup, Cholesterol Followup.          [1]   Social History  Socioeconomic History    Marital status:     Number of children: 2   Occupational History    Occupation: Liason at [employer]   Tobacco Use    Smoking status: Never    Smokeless tobacco: Never   Substance and Sexual Activity    Alcohol use: Not Currently     Alcohol/week: 2.0 standard drinks of alcohol     Types: 1 Glasses of wine, 1 Cans of beer per week    Drug use: Never    Sexual activity: Yes     Partners: Female     Social Drivers of Health     Financial Resource Strain: Low Risk  (4/12/2025)    Overall Financial Resource Strain (CARDIA)     Difficulty of Paying Living Expenses: Not very hard   Food Insecurity: No Food Insecurity (4/12/2025)    Hunger Vital Sign     Worried About Running Out of Food in the  Last Year: Never true     Ran Out of Food in the Last Year: Never true   Transportation Needs: No Transportation Needs (4/12/2025)    PRAPARE - Transportation     Lack of Transportation (Medical): No     Lack of Transportation (Non-Medical): No   Physical Activity: Insufficiently Active (4/12/2025)    Exercise Vital Sign     Days of Exercise per Week: 3 days     Minutes of Exercise per Session: 20 min   Stress: Stress Concern Present (4/12/2025)    [employer] [place] [employer] - Occupational Stress Questionnaire     Feeling of Stress : To some extent   Housing Stability: Low Risk  (4/12/2025)    Housing Stability Vital Sign     Unable to Pay for Housing in the Last Year: No     Number of Times Moved in the Last Year: 0     Homeless in the Last Year: No

## 2025-04-16 ENCOUNTER — LAB VISIT (OUTPATIENT)
Dept: LAB | Facility: HOSPITAL | Age: 60
End: 2025-04-16
Attending: FAMILY MEDICINE
Payer: COMMERCIAL

## 2025-04-16 DIAGNOSIS — D70.9 NEUTROPENIA, UNSPECIFIED TYPE: ICD-10-CM

## 2025-04-16 DIAGNOSIS — Z12.5 SCREENING PSA (PROSTATE SPECIFIC ANTIGEN): ICD-10-CM

## 2025-04-16 DIAGNOSIS — Z00.00 WELLNESS EXAMINATION: ICD-10-CM

## 2025-04-16 DIAGNOSIS — J06.9 UPPER RESPIRATORY TRACT INFECTION, UNSPECIFIED TYPE: ICD-10-CM

## 2025-04-16 DIAGNOSIS — R79.89 ELEVATED VITAMIN B12 LEVEL: ICD-10-CM

## 2025-04-16 DIAGNOSIS — Z76.89 ENCOUNTER TO ESTABLISH CARE WITH NEW DOCTOR: ICD-10-CM

## 2025-04-16 LAB
ALBUMIN SERPL-MCNC: 4.2 G/DL (ref 3.5–5)
ALBUMIN/GLOB SERPL: 1.2 RATIO (ref 1.1–2)
ALP SERPL-CCNC: 91 UNIT/L (ref 40–150)
ALT SERPL-CCNC: 22 UNIT/L (ref 0–55)
ANION GAP SERPL CALC-SCNC: 5 MEQ/L
AST SERPL-CCNC: 21 UNIT/L (ref 11–45)
BACTERIA #/AREA URNS AUTO: ABNORMAL /HPF
BASOPHILS # BLD AUTO: 0.03 X10(3)/MCL
BASOPHILS NFR BLD AUTO: 0.7 %
BILIRUB SERPL-MCNC: 0.6 MG/DL
BILIRUB UR QL STRIP.AUTO: NEGATIVE
BUN SERPL-MCNC: 13.7 MG/DL (ref 8.4–25.7)
CALCIUM SERPL-MCNC: 9.5 MG/DL (ref 8.4–10.2)
CHLORIDE SERPL-SCNC: 107 MMOL/L (ref 98–107)
CLARITY UR: CLEAR
CO2 SERPL-SCNC: 28 MMOL/L (ref 22–29)
COLOR UR AUTO: YELLOW
CREAT SERPL-MCNC: 1.14 MG/DL (ref 0.72–1.25)
CREAT/UREA NIT SERPL: 12
EOSINOPHIL # BLD AUTO: 0.1 X10(3)/MCL (ref 0–0.9)
EOSINOPHIL NFR BLD AUTO: 2.3 %
ERYTHROCYTE [DISTWIDTH] IN BLOOD BY AUTOMATED COUNT: 14.1 % (ref 11.5–17)
FOLATE SERPL-MCNC: 12 NG/ML (ref 7–31.4)
GFR SERPLBLD CREATININE-BSD FMLA CKD-EPI: >60 ML/MIN/1.73/M2
GLOBULIN SER-MCNC: 3.5 GM/DL (ref 2.4–3.5)
GLUCOSE SERPL-MCNC: 102 MG/DL (ref 74–100)
GLUCOSE UR QL STRIP: NORMAL
HCT VFR BLD AUTO: 45.2 % (ref 42–52)
HGB BLD-MCNC: 14.4 G/DL (ref 14–18)
HGB UR QL STRIP: NEGATIVE
IMM GRANULOCYTES # BLD AUTO: 0.01 X10(3)/MCL (ref 0–0.04)
IMM GRANULOCYTES NFR BLD AUTO: 0.2 %
KETONES UR QL STRIP: NEGATIVE
LEUKOCYTE ESTERASE UR QL STRIP: NEGATIVE
LYMPHOCYTES # BLD AUTO: 2.05 X10(3)/MCL (ref 0.6–4.6)
LYMPHOCYTES NFR BLD AUTO: 47.7 %
MCH RBC QN AUTO: 27.1 PG (ref 27–31)
MCHC RBC AUTO-ENTMCNC: 31.9 G/DL (ref 33–36)
MCV RBC AUTO: 85.1 FL (ref 80–94)
MONOCYTES # BLD AUTO: 0.78 X10(3)/MCL (ref 0.1–1.3)
MONOCYTES NFR BLD AUTO: 18.1 %
MUCOUS THREADS URNS QL MICRO: ABNORMAL /LPF
NEUTROPHILS # BLD AUTO: 1.33 X10(3)/MCL (ref 2.1–9.2)
NEUTROPHILS NFR BLD AUTO: 31 %
NITRITE UR QL STRIP: NEGATIVE
NRBC BLD AUTO-RTO: 0 %
PH UR STRIP: 5.5 [PH]
PLATELET # BLD AUTO: 241 X10(3)/MCL (ref 130–400)
PMV BLD AUTO: 9.8 FL (ref 7.4–10.4)
POTASSIUM SERPL-SCNC: 4.4 MMOL/L (ref 3.5–5.1)
PROT SERPL-MCNC: 7.7 GM/DL (ref 6.4–8.3)
PROT UR QL STRIP: ABNORMAL
PSA SERPL-MCNC: 1.18 NG/ML
RBC # BLD AUTO: 5.31 X10(6)/MCL (ref 4.7–6.1)
RBC #/AREA URNS AUTO: ABNORMAL /HPF
SODIUM SERPL-SCNC: 140 MMOL/L (ref 136–145)
SP GR UR STRIP.AUTO: 1.02 (ref 1–1.03)
SQUAMOUS #/AREA URNS LPF: ABNORMAL /HPF
TESTOST SERPL-MCNC: 420.89 NG/DL (ref 220.91–715.81)
TSH SERPL-ACNC: 2.19 UIU/ML (ref 0.35–4.94)
UROBILINOGEN UR STRIP-ACNC: NORMAL
VIT B12 SERPL-MCNC: 1079 PG/ML (ref 213–816)
WBC # BLD AUTO: 4.3 X10(3)/MCL (ref 4.5–11.5)
WBC #/AREA URNS AUTO: ABNORMAL /HPF

## 2025-04-16 PROCEDURE — 84153 ASSAY OF PSA TOTAL: CPT

## 2025-04-16 PROCEDURE — 86431 RHEUMATOID FACTOR QUANT: CPT

## 2025-04-16 PROCEDURE — 86235 NUCLEAR ANTIGEN ANTIBODY: CPT

## 2025-04-16 PROCEDURE — 86431 RHEUMATOID FACTOR QUANT: CPT | Mod: 59

## 2025-04-16 PROCEDURE — 80053 COMPREHEN METABOLIC PANEL: CPT

## 2025-04-16 PROCEDURE — 86200 CCP ANTIBODY: CPT

## 2025-04-16 PROCEDURE — 84443 ASSAY THYROID STIM HORMONE: CPT

## 2025-04-16 PROCEDURE — 82728 ASSAY OF FERRITIN: CPT

## 2025-04-16 PROCEDURE — 36415 COLL VENOUS BLD VENIPUNCTURE: CPT

## 2025-04-16 PROCEDURE — 85025 COMPLETE CBC W/AUTO DIFF WBC: CPT

## 2025-04-16 PROCEDURE — 84403 ASSAY OF TOTAL TESTOSTERONE: CPT

## 2025-04-16 PROCEDURE — 82785 ASSAY OF IGE: CPT

## 2025-04-16 PROCEDURE — 81001 URINALYSIS AUTO W/SCOPE: CPT

## 2025-04-16 PROCEDURE — 82746 ASSAY OF FOLIC ACID SERUM: CPT

## 2025-04-16 PROCEDURE — 82607 VITAMIN B-12: CPT

## 2025-04-17 ENCOUNTER — RESULTS FOLLOW-UP (OUTPATIENT)
Dept: FAMILY MEDICINE | Facility: CLINIC | Age: 60
End: 2025-04-17

## 2025-04-17 DIAGNOSIS — R76.8 ELEVATED RHEUMATOID FACTOR: ICD-10-CM

## 2025-04-17 DIAGNOSIS — R80.9 PROTEINURIA, UNSPECIFIED TYPE: Primary | ICD-10-CM

## 2025-04-18 LAB
FERRITIN SERPL IA-MCNC: 445 MCG/L (ref 31–409)
HGB A MFR BLD ELPH: 97.7 % (ref 95.8–98)
HGB A2 MFR BLD ELPH: 2.3 % (ref 2–3.3)
HGB F MFR BLD ELPH: 0 % (ref 0–0.9)
HGB FRACT BLD ELPH-IMP: ABNORMAL
M HPLC HB VARIANT, B: ABNORMAL
PROVIDER SIGNING NAME: ABNORMAL

## 2025-04-21 ENCOUNTER — TELEPHONE (OUTPATIENT)
Dept: FAMILY MEDICINE | Facility: CLINIC | Age: 60
End: 2025-04-21
Payer: COMMERCIAL

## 2025-04-21 LAB
ANTINUCLEAR ANTIBODY SCREEN (OHS): NEGATIVE
CENTROMERE QUANT (OHS): <0.4 U/ML
CYCLIC CITRULLINATED PEPTIDE (CCP) (OHS): 1 U/ML
DSDNA AB QUANT (OHS): 0.7 IU/ML
JO-1 AB QUANT (OHS): <0.3 U/ML
RHEUMATOID FACTOR IGA QUANTITATIVE (OLG): 9.9 IU/ML
RHEUMATOID FACTOR IGM QUANTITATIVE (OLG): 8.1 IU/ML
RNP70 AB QUANT (OHS): 1.6 U/ML
SCL-70S AB QUANT (OHS): <0.6 U/ML
SMITH AB QUANT (OHS): <0.7 U/ML
SSA(RO) AB QUANT (OHS): <0.4 U/ML
SSB(LA) AB QUANT (OHS): <0.4 U/ML
U1RNP AB QUANT (OHS): 1.2 U/ML

## 2025-04-21 NOTE — TELEPHONE ENCOUNTER
----- Message from Vickie Talamantes MD sent at 4/17/2025  7:27 PM CDT -----  Vitamin B12 level is still mildly elevated and his white blood cell count is still mildly decreased, awaiting results of pending autoimmune disease panel for further evaluation and treatment   recommendations.    Urinalysis shows a trace of protein in his urine, otherwise normal.  Order to repeat urinalysis at his earliest convenience is in file.  If protein remains in urine, we will proceed with an   ultrasound of his kidneys for further evaluation and treatment recommendations.    Liver function, kidney function, and electrolytes are normal.    There is no anemia, platelet count is normal.      Folate level is normal.     TSH (thyroid test) is normal.      PSA (prostate test) is normal.      Testosterone level is normal.      Allergy panel, autoimmune disease panel, and hemoglobin electrophoresis are still pending.    Thank you for choosing [facility] for your medical needs. Have a great day!   -Vickie Talamantes MD, [facility]    ----- Message -----  From: Lab, Background User  Sent: 4/16/2025   9:02 AM CDT  To: Vickie Talamantes MD

## 2025-04-22 ENCOUNTER — RESULTS FOLLOW-UP (OUTPATIENT)
Dept: FAMILY MEDICINE | Facility: CLINIC | Age: 60
End: 2025-04-22

## 2025-04-22 ENCOUNTER — PATIENT MESSAGE (OUTPATIENT)
Dept: FAMILY MEDICINE | Facility: CLINIC | Age: 60
End: 2025-04-22
Payer: COMMERCIAL

## 2025-04-22 ENCOUNTER — LAB VISIT (OUTPATIENT)
Dept: LAB | Facility: HOSPITAL | Age: 60
End: 2025-04-22
Attending: FAMILY MEDICINE
Payer: COMMERCIAL

## 2025-04-22 DIAGNOSIS — R80.9 PROTEINURIA, UNSPECIFIED TYPE: ICD-10-CM

## 2025-04-22 LAB
ALLERGEN ALTERNARIA ALTERNATA IGE (OLG): <0.1 KUA/L
ALLERGEN ASPERGILLUS FUMIGATUS IGE (OLG): <0.1 KUA/L
ALLERGEN BERMUDA GRASS IGE (OLG): <0.1 KUA/L
ALLERGEN BOXELDER MAPLE TREE IGE (OLG): <0.1 KUA/L
ALLERGEN CAT DANDER IGE (OLG): <0.1 KUA/L
ALLERGEN CLADOSPORIUM HERBARUM IGE (OLG): <0.1 KUA/L
ALLERGEN COCKROACH GERMAN IGE (OLG): <0.1 KUA/L
ALLERGEN COMMON RAGWEED IGE (OLG): <0.1 KUA/L
ALLERGEN DOG DANDER IGE (OLG): <0.1 KUA/L
ALLERGEN DUST MITE (D. PTERONYSSINUS) IGE (OLG): <0.1 KUA/L
ALLERGEN DUST MITE (D.FARINAE) IGE (OLG): <0.1 KUA/L
ALLERGEN ELM TREE IGE (OLG): <0.1 KUA/L
ALLERGEN HORSE DANDER IGE (OLG): <0.1 KUA/L
ALLERGEN MOUNTAIN JUNIPER TREE IGE (OLG): <0.1 KUA/L
ALLERGEN MOUSE URINE PROTEINS IGE (OLG): <0.1 KUA/L
ALLERGEN MULBERRY TREE IGE (OLG): <0.1 KUA/L
ALLERGEN OAK TREE IGE (OLG): <0.1 KUA/L
ALLERGEN PECAN HICKORY TREE IGE (OLG): <0.1 KUA/L
ALLERGEN PENICILLIUM CHRYSOGENUM IGE (OLG): <0.1 KUA/L
ALLERGEN PIGWEED IGE (OLG): <0.1 KUA/L
ALLERGEN ROUGH MARSH ELDER IGE (OLG): <0.1 KUA/L
ALLERGEN SILVER BIRCH TREE IGE (OLG): <0.1 KUA/L
ALLERGEN TIMOTHY GRASS IGE (OLG): <0.1 KUA/L
ALLERGEN WALNUT TREE IGE (OLG): <0.1 KUA/L
BACTERIA #/AREA URNS AUTO: ABNORMAL /HPF
BILIRUB UR QL STRIP.AUTO: NEGATIVE
CLARITY UR: CLEAR
COLOR UR AUTO: ABNORMAL
GLUCOSE UR QL STRIP: NORMAL
HGB UR QL STRIP: NEGATIVE
KETONES UR QL STRIP: NEGATIVE
LEUKOCYTE ESTERASE UR QL STRIP: NEGATIVE
MUCOUS THREADS URNS QL MICRO: ABNORMAL /LPF
NITRITE UR QL STRIP: NEGATIVE
PH UR STRIP: 5.5 [PH]
PHADIOTOP IGE QN: 39.1 KU/L
PROT UR QL STRIP: NEGATIVE
RBC #/AREA URNS AUTO: ABNORMAL /HPF
SP GR UR STRIP.AUTO: 1.02 (ref 1–1.03)
SQUAMOUS #/AREA URNS LPF: ABNORMAL /HPF
UROBILINOGEN UR STRIP-ACNC: NORMAL
WBC #/AREA URNS AUTO: ABNORMAL /HPF

## 2025-04-22 PROCEDURE — 81001 URINALYSIS AUTO W/SCOPE: CPT

## 2025-04-23 ENCOUNTER — TELEPHONE (OUTPATIENT)
Dept: FAMILY MEDICINE | Facility: CLINIC | Age: 60
End: 2025-04-23
Payer: COMMERCIAL

## 2025-04-23 NOTE — TELEPHONE ENCOUNTER
Patient voiced understanding of lab result but he does voiced concerns about his CBC specifically the neut results. He is wondering if he should be concerned about this results, please advise thanks !   ----- Message from Vickie Talamantes MD sent at 4/22/2025  9:19 PM CDT -----  Rheumatoid factor is elevated and can be suggestive of rheumatoid arthritis.  I placed a referral to a rheumatologist for further evaluation and treatment recommendations.  Remaining autoimmune   disease panel was negative.    Hemoglobin electrophoresis shows no evidence of abnormal hemoglobin are beta thalassemia protein methods.  Ferritin level is not supportive of iron deficiency.    Allergy panel is negative.    Thank you for choosing [facility] for your medical needs. Have a great day!   -Vickie Talamantes MD, [facility]    ----- Message -----  From: Lab, Background User  Sent: 4/16/2025   9:02 AM CDT  To: Vickie Talamantes MD

## 2025-04-24 ENCOUNTER — E-CONSULT (OUTPATIENT)
Dept: HEMATOLOGY/ONCOLOGY | Facility: CLINIC | Age: 60
End: 2025-04-24
Payer: COMMERCIAL

## 2025-04-24 ENCOUNTER — PATIENT MESSAGE (OUTPATIENT)
Dept: FAMILY MEDICINE | Facility: CLINIC | Age: 60
End: 2025-04-24
Payer: COMMERCIAL

## 2025-04-24 ENCOUNTER — TELEPHONE (OUTPATIENT)
Dept: FAMILY MEDICINE | Facility: CLINIC | Age: 60
End: 2025-04-24
Payer: COMMERCIAL

## 2025-04-24 DIAGNOSIS — D70.9 NEUTROPENIA, UNSPECIFIED TYPE: Primary | ICD-10-CM

## 2025-04-24 NOTE — TELEPHONE ENCOUNTER
Goodmorning patient does consent to a hematologist to look over his labs he voices thanks for listening to his concerns.!

## 2025-04-24 NOTE — CONSULTS
[facility] [place] [facility] [place]  Response for E-Consult     Patient Name: Natan Momin  MRN: [id number]  Primary Care Provider: Vickie Talamantes MD   Requesting Provider: Vickie Talamantes, *  E-Consult to Hemonc  Consult performed by: Angelo Zurita MD  Consult ordered by: Vickie Talamantes MD  Reason for consult: Neutropenia          Recommendation:     Get Hep B labs, MMA, zinc, copper labs, if low replace    Additional future steps to consider: recheck CBC in 3-6 months and if still low or not improved consider     Total time of Consultation: 10 minutes    I did not speak to the requesting provider verbally about this.     *This eConsult is based on the clinical data available to me and is furnished without benefit of a physical examination. The eConsult will need to be interpreted in light of any clinical issues or changes in patient status not available to me at the time of filing this eConsults. Significant changes in patient condition or level of acuity should result in immediate formal consultation and reevaluation. Please alert me if you have further questions.    Thank you for this eConsult referral.     Angelo Zurita MD  [facility] [place] [facility]

## 2025-04-28 ENCOUNTER — TELEPHONE (OUTPATIENT)
Dept: FAMILY MEDICINE | Facility: CLINIC | Age: 60
End: 2025-04-28
Payer: COMMERCIAL

## 2025-04-28 NOTE — TELEPHONE ENCOUNTER
Spoke with patient about hematology consults and the additional labs that are needed, patient voices understanding and will get done.

## 2025-05-01 ENCOUNTER — LAB VISIT (OUTPATIENT)
Dept: LAB | Facility: HOSPITAL | Age: 60
End: 2025-05-01
Attending: FAMILY MEDICINE
Payer: COMMERCIAL

## 2025-05-01 DIAGNOSIS — D70.9 NEUTROPENIA, UNSPECIFIED TYPE: ICD-10-CM

## 2025-05-01 PROCEDURE — 36415 COLL VENOUS BLD VENIPUNCTURE: CPT

## 2025-05-01 PROCEDURE — 84630 ASSAY OF ZINC: CPT

## 2025-05-01 PROCEDURE — 83921 ORGANIC ACID SINGLE QUANT: CPT

## 2025-05-01 PROCEDURE — 87517 HEPATITIS B DNA QUANT: CPT

## 2025-05-01 PROCEDURE — 82525 ASSAY OF COPPER: CPT

## 2025-05-01 PROCEDURE — 86704 HEP B CORE ANTIBODY TOTAL: CPT

## 2025-05-01 PROCEDURE — 86706 HEP B SURFACE ANTIBODY: CPT

## 2025-05-02 LAB
HBV CORE AB SERPL QL IA: NONREACTIVE
HBV SURFACE AB SER-ACNC: 0 MIU/ML
HBV SURFACE AB SERPL IA-ACNC: NONREACTIVE M[IU]/ML

## 2025-05-03 LAB
COPPER SERPL-MCNC: 88 MCG/DL (ref 73–129)
HBV DNA SERPL NAA+PROBE-ACNC: NORMAL IU/ML
ZINC SERPL-MCNC: 72 MCG/DL (ref 60–106)

## 2025-05-04 ENCOUNTER — RESULTS FOLLOW-UP (OUTPATIENT)
Dept: FAMILY MEDICINE | Facility: CLINIC | Age: 60
End: 2025-05-04

## 2025-05-05 ENCOUNTER — PATIENT MESSAGE (OUTPATIENT)
Dept: FAMILY MEDICINE | Facility: CLINIC | Age: 60
End: 2025-05-05
Payer: COMMERCIAL

## 2025-05-06 ENCOUNTER — PATIENT MESSAGE (OUTPATIENT)
Dept: FAMILY MEDICINE | Facility: CLINIC | Age: 60
End: 2025-05-06
Payer: COMMERCIAL

## 2025-05-06 LAB — METHYLMALONATE SERPL-SCNC: 0.13 NMOL/ML

## 2025-06-16 ENCOUNTER — TELEPHONE (OUTPATIENT)
Dept: FAMILY MEDICINE | Facility: CLINIC | Age: 60
End: 2025-06-16
Payer: COMMERCIAL

## 2025-06-16 NOTE — TELEPHONE ENCOUNTER
patient called requesting to speak with nurse in regards to getting lab results sent to another doctor ([facility])

## 2025-06-19 RX ORDER — ATORVASTATIN CALCIUM 20 MG/1
20 TABLET, FILM COATED ORAL DAILY
Qty: 90 TABLET | Refills: 3 | Status: SHIPPED | OUTPATIENT
Start: 2025-06-19